# Patient Record
Sex: FEMALE | Race: ASIAN | NOT HISPANIC OR LATINO | URBAN - METROPOLITAN AREA
[De-identification: names, ages, dates, MRNs, and addresses within clinical notes are randomized per-mention and may not be internally consistent; named-entity substitution may affect disease eponyms.]

---

## 2023-04-25 ENCOUNTER — INPATIENT (INPATIENT)
Facility: HOSPITAL | Age: 33
LOS: 2 days | Discharge: HOME | End: 2023-04-28
Attending: OBSTETRICS & GYNECOLOGY | Admitting: OBSTETRICS & GYNECOLOGY
Payer: COMMERCIAL

## 2023-04-25 VITALS — HEIGHT: 60 IN | WEIGHT: 145.06 LBS

## 2023-04-25 DIAGNOSIS — Z3A.00 WEEKS OF GESTATION OF PREGNANCY NOT SPECIFIED: ICD-10-CM

## 2023-04-25 DIAGNOSIS — O26.899 OTHER SPECIFIED PREGNANCY RELATED CONDITIONS, UNSPECIFIED TRIMESTER: ICD-10-CM

## 2023-04-25 LAB
ALBUMIN SERPL ELPH-MCNC: 3.4 G/DL — SIGNIFICANT CHANGE UP (ref 3.3–5)
ALP SERPL-CCNC: 113 U/L — SIGNIFICANT CHANGE UP (ref 40–120)
ALT FLD-CCNC: 20 U/L — SIGNIFICANT CHANGE UP (ref 10–45)
AMNISURE ROM (RUPTURE OF MEMBRANES): POSITIVE
ANION GAP SERPL CALC-SCNC: 8 MMOL/L — SIGNIFICANT CHANGE UP (ref 5–17)
APTT BLD: 30.3 SEC — SIGNIFICANT CHANGE UP (ref 27.5–35.5)
AST SERPL-CCNC: 21 U/L — SIGNIFICANT CHANGE UP (ref 10–40)
BASOPHILS # BLD AUTO: 0.03 K/UL — SIGNIFICANT CHANGE UP (ref 0–0.2)
BASOPHILS NFR BLD AUTO: 0.3 % — SIGNIFICANT CHANGE UP (ref 0–2)
BILIRUB SERPL-MCNC: 0.2 MG/DL — SIGNIFICANT CHANGE UP (ref 0.2–1.2)
BLD GP AB SCN SERPL QL: NEGATIVE — SIGNIFICANT CHANGE UP
BUN SERPL-MCNC: 8 MG/DL — SIGNIFICANT CHANGE UP (ref 7–23)
CALCIUM SERPL-MCNC: 9.4 MG/DL — SIGNIFICANT CHANGE UP (ref 8.4–10.5)
CHLORIDE SERPL-SCNC: 104 MMOL/L — SIGNIFICANT CHANGE UP (ref 96–108)
CO2 SERPL-SCNC: 23 MMOL/L — SIGNIFICANT CHANGE UP (ref 22–31)
CREAT ?TM UR-MCNC: 142 MG/DL — SIGNIFICANT CHANGE UP
CREAT SERPL-MCNC: 0.49 MG/DL — LOW (ref 0.5–1.3)
EGFR: 128 ML/MIN/1.73M2 — SIGNIFICANT CHANGE UP
EOSINOPHIL # BLD AUTO: 0.07 K/UL — SIGNIFICANT CHANGE UP (ref 0–0.5)
EOSINOPHIL NFR BLD AUTO: 0.7 % — SIGNIFICANT CHANGE UP (ref 0–6)
FIBRINOGEN PPP-MCNC: 611 MG/DL — HIGH (ref 200–445)
GLUCOSE SERPL-MCNC: 109 MG/DL — HIGH (ref 70–99)
HCT VFR BLD CALC: 36.4 % — SIGNIFICANT CHANGE UP (ref 34.5–45)
HGB BLD-MCNC: 12.2 G/DL — SIGNIFICANT CHANGE UP (ref 11.5–15.5)
IMM GRANULOCYTES NFR BLD AUTO: 0.4 % — SIGNIFICANT CHANGE UP (ref 0–0.9)
INR BLD: 0.89 — SIGNIFICANT CHANGE UP (ref 0.88–1.16)
LYMPHOCYTES # BLD AUTO: 1.16 K/UL — SIGNIFICANT CHANGE UP (ref 1–3.3)
LYMPHOCYTES # BLD AUTO: 11.5 % — LOW (ref 13–44)
MAGNESIUM SERPL-MCNC: 2 MG/DL — SIGNIFICANT CHANGE UP (ref 1.6–2.6)
MCHC RBC-ENTMCNC: 32.6 PG — SIGNIFICANT CHANGE UP (ref 27–34)
MCHC RBC-ENTMCNC: 33.5 GM/DL — SIGNIFICANT CHANGE UP (ref 32–36)
MCV RBC AUTO: 97.3 FL — SIGNIFICANT CHANGE UP (ref 80–100)
MONOCYTES # BLD AUTO: 0.49 K/UL — SIGNIFICANT CHANGE UP (ref 0–0.9)
MONOCYTES NFR BLD AUTO: 4.8 % — SIGNIFICANT CHANGE UP (ref 2–14)
NEUTROPHILS # BLD AUTO: 8.32 K/UL — HIGH (ref 1.8–7.4)
NEUTROPHILS NFR BLD AUTO: 82.3 % — HIGH (ref 43–77)
NRBC # BLD: 0 /100 WBCS — SIGNIFICANT CHANGE UP (ref 0–0)
PHOSPHATE SERPL-MCNC: 3.5 MG/DL — SIGNIFICANT CHANGE UP (ref 2.5–4.5)
PLATELET # BLD AUTO: 295 K/UL — SIGNIFICANT CHANGE UP (ref 150–400)
POTASSIUM SERPL-MCNC: 4 MMOL/L — SIGNIFICANT CHANGE UP (ref 3.5–5.3)
POTASSIUM SERPL-SCNC: 4 MMOL/L — SIGNIFICANT CHANGE UP (ref 3.5–5.3)
PROT ?TM UR-MCNC: 16 MG/DL — HIGH (ref 0–12)
PROT SERPL-MCNC: 6.5 G/DL — SIGNIFICANT CHANGE UP (ref 6–8.3)
PROT/CREAT UR-RTO: 0.1 RATIO — SIGNIFICANT CHANGE UP (ref 0–0.2)
PROTHROM AB SERPL-ACNC: 10.6 SEC — SIGNIFICANT CHANGE UP (ref 10.5–13.4)
RBC # BLD: 3.74 M/UL — LOW (ref 3.8–5.2)
RBC # FLD: 13.2 % — SIGNIFICANT CHANGE UP (ref 10.3–14.5)
RH IG SCN BLD-IMP: POSITIVE — SIGNIFICANT CHANGE UP
RH IG SCN BLD-IMP: POSITIVE — SIGNIFICANT CHANGE UP
SODIUM SERPL-SCNC: 135 MMOL/L — SIGNIFICANT CHANGE UP (ref 135–145)
WBC # BLD: 10.11 K/UL — SIGNIFICANT CHANGE UP (ref 3.8–10.5)
WBC # FLD AUTO: 10.11 K/UL — SIGNIFICANT CHANGE UP (ref 3.8–10.5)

## 2023-04-25 PROCEDURE — 88307 TISSUE EXAM BY PATHOLOGIST: CPT | Mod: 26

## 2023-04-25 DEVICE — INTERCEED 3 X 4": Type: IMPLANTABLE DEVICE | Status: FUNCTIONAL

## 2023-04-25 RX ORDER — AZITHROMYCIN 500 MG/1
500 TABLET, FILM COATED ORAL ONCE
Refills: 0 | Status: DISCONTINUED | OUTPATIENT
Start: 2023-04-25 | End: 2023-04-25

## 2023-04-25 RX ORDER — OXYTOCIN 10 UNIT/ML
333.33 VIAL (ML) INJECTION
Qty: 20 | Refills: 0 | Status: DISCONTINUED | OUTPATIENT
Start: 2023-04-25 | End: 2023-04-28

## 2023-04-25 RX ORDER — OXYCODONE HYDROCHLORIDE 5 MG/1
5 TABLET ORAL
Refills: 0 | Status: DISCONTINUED | OUTPATIENT
Start: 2023-04-25 | End: 2023-04-28

## 2023-04-25 RX ORDER — SIMETHICONE 80 MG/1
80 TABLET, CHEWABLE ORAL EVERY 4 HOURS
Refills: 0 | Status: DISCONTINUED | OUTPATIENT
Start: 2023-04-25 | End: 2023-04-28

## 2023-04-25 RX ORDER — SODIUM CHLORIDE 9 MG/ML
1000 INJECTION, SOLUTION INTRAVENOUS ONCE
Refills: 0 | Status: DISCONTINUED | OUTPATIENT
Start: 2023-04-25 | End: 2023-04-25

## 2023-04-25 RX ORDER — OXYCODONE HYDROCHLORIDE 5 MG/1
5 TABLET ORAL ONCE
Refills: 0 | Status: DISCONTINUED | OUTPATIENT
Start: 2023-04-25 | End: 2023-04-28

## 2023-04-25 RX ORDER — SODIUM CHLORIDE 9 MG/ML
1000 INJECTION, SOLUTION INTRAVENOUS
Refills: 0 | Status: DISCONTINUED | OUTPATIENT
Start: 2023-04-25 | End: 2023-04-25

## 2023-04-25 RX ORDER — IBUPROFEN 200 MG
600 TABLET ORAL EVERY 6 HOURS
Refills: 0 | Status: COMPLETED | OUTPATIENT
Start: 2023-04-25 | End: 2024-03-23

## 2023-04-25 RX ORDER — ACETAMINOPHEN 500 MG
975 TABLET ORAL
Refills: 0 | Status: DISCONTINUED | OUTPATIENT
Start: 2023-04-25 | End: 2023-04-28

## 2023-04-25 RX ORDER — KETOROLAC TROMETHAMINE 30 MG/ML
30 SYRINGE (ML) INJECTION EVERY 6 HOURS
Refills: 0 | Status: DISCONTINUED | OUTPATIENT
Start: 2023-04-25 | End: 2023-04-26

## 2023-04-25 RX ORDER — NALOXONE HYDROCHLORIDE 4 MG/.1ML
0.1 SPRAY NASAL
Refills: 0 | Status: DISCONTINUED | OUTPATIENT
Start: 2023-04-25 | End: 2023-04-25

## 2023-04-25 RX ORDER — OXYTOCIN 10 UNIT/ML
333.33 VIAL (ML) INJECTION
Qty: 20 | Refills: 0 | Status: DISCONTINUED | OUTPATIENT
Start: 2023-04-25 | End: 2023-04-25

## 2023-04-25 RX ORDER — LANOLIN
1 OINTMENT (GRAM) TOPICAL EVERY 6 HOURS
Refills: 0 | Status: DISCONTINUED | OUTPATIENT
Start: 2023-04-25 | End: 2023-04-28

## 2023-04-25 RX ORDER — DEXAMETHASONE 0.5 MG/5ML
4 ELIXIR ORAL EVERY 6 HOURS
Refills: 0 | Status: DISCONTINUED | OUTPATIENT
Start: 2023-04-25 | End: 2023-04-25

## 2023-04-25 RX ORDER — SODIUM CHLORIDE 9 MG/ML
1000 INJECTION, SOLUTION INTRAVENOUS
Refills: 0 | Status: DISCONTINUED | OUTPATIENT
Start: 2023-04-25 | End: 2023-04-28

## 2023-04-25 RX ORDER — CITRIC ACID/SODIUM CITRATE 300-500 MG
30 SOLUTION, ORAL ORAL ONCE
Refills: 0 | Status: COMPLETED | OUTPATIENT
Start: 2023-04-25 | End: 2023-04-25

## 2023-04-25 RX ORDER — CEFAZOLIN SODIUM 1 G
2000 VIAL (EA) INJECTION ONCE
Refills: 0 | Status: COMPLETED | OUTPATIENT
Start: 2023-04-25 | End: 2023-04-25

## 2023-04-25 RX ORDER — MAGNESIUM HYDROXIDE 400 MG/1
30 TABLET, CHEWABLE ORAL
Refills: 0 | Status: DISCONTINUED | OUTPATIENT
Start: 2023-04-25 | End: 2023-04-28

## 2023-04-25 RX ORDER — TETANUS TOXOID, REDUCED DIPHTHERIA TOXOID AND ACELLULAR PERTUSSIS VACCINE, ADSORBED 5; 2.5; 8; 8; 2.5 [IU]/.5ML; [IU]/.5ML; UG/.5ML; UG/.5ML; UG/.5ML
0.5 SUSPENSION INTRAMUSCULAR ONCE
Refills: 0 | Status: DISCONTINUED | OUTPATIENT
Start: 2023-04-25 | End: 2023-04-28

## 2023-04-25 RX ORDER — DIPHENHYDRAMINE HCL 50 MG
25 CAPSULE ORAL EVERY 6 HOURS
Refills: 0 | Status: DISCONTINUED | OUTPATIENT
Start: 2023-04-25 | End: 2023-04-28

## 2023-04-25 RX ORDER — FAMOTIDINE 10 MG/ML
20 INJECTION INTRAVENOUS ONCE
Refills: 0 | Status: COMPLETED | OUTPATIENT
Start: 2023-04-25 | End: 2023-04-25

## 2023-04-25 RX ORDER — ONDANSETRON 8 MG/1
4 TABLET, FILM COATED ORAL EVERY 6 HOURS
Refills: 0 | Status: DISCONTINUED | OUTPATIENT
Start: 2023-04-25 | End: 2023-04-25

## 2023-04-25 RX ADMIN — Medication 30 MILLILITER(S): at 15:26

## 2023-04-25 RX ADMIN — Medication 100 MILLIGRAM(S): at 15:26

## 2023-04-25 RX ADMIN — SODIUM CHLORIDE 125 MILLILITER(S): 9 INJECTION, SOLUTION INTRAVENOUS at 13:44

## 2023-04-25 RX ADMIN — Medication 12 MILLIGRAM(S): at 13:25

## 2023-04-25 RX ADMIN — Medication 30 MILLIGRAM(S): at 19:34

## 2023-04-25 RX ADMIN — FAMOTIDINE 20 MILLIGRAM(S): 10 INJECTION INTRAVENOUS at 15:26

## 2023-04-25 NOTE — LACTATION INITIAL EVALUATION - NS LACT CON REASON FOR REQ
routine NICU Consult/inverted/flat nipples/primaparous mom/premature infant/patient request/NICU admission

## 2023-04-25 NOTE — PATIENT PROFILE OB - FALL HARM RISK - UNIVERSAL INTERVENTIONS
Bed in lowest position, wheels locked, appropriate side rails in place/Call bell, personal items and telephone in reach/Instruct patient to call for assistance before getting out of bed or chair/Non-slip footwear when patient is out of bed/[place] to call system/Physically safe environment - no spills, clutter or unnecessary equipment/Purposeful Proactive Rounding/Room/bathroom lighting operational, light cord in reach

## 2023-04-25 NOTE — PRE-ANESTHESIA EVALUATION ADULT - NSANTHOSAYNRD_GEN_A_CORE
No. FREDERIC screening performed.  STOP BANG Legend: 0-2 = LOW Risk; 3-4 = INTERMEDIATE Risk; 5-8 = HIGH Risk

## 2023-04-25 NOTE — PRE-ANESTHESIA EVALUATION ADULT - NSANTHAPLANRD_GEN_ALL_CORE
Per Dr Walden: CT cervical spine ordered.  Pt advised of same.    Dr Walden,  I did pend an order for CT with contrast.  Please sign, if appropriate  Thank you!    Nursing staff -- no need to call pt back.   regional

## 2023-04-26 LAB
BASOPHILS # BLD AUTO: 0.01 K/UL — SIGNIFICANT CHANGE UP (ref 0–0.2)
BASOPHILS NFR BLD AUTO: 0.1 % — SIGNIFICANT CHANGE UP (ref 0–2)
COVID-19 SPIKE DOMAIN AB INTERP: POSITIVE
COVID-19 SPIKE DOMAIN ANTIBODY RESULT: >250 U/ML — HIGH
EOSINOPHIL # BLD AUTO: 0 K/UL — SIGNIFICANT CHANGE UP (ref 0–0.5)
EOSINOPHIL NFR BLD AUTO: 0 % — SIGNIFICANT CHANGE UP (ref 0–6)
HCT VFR BLD CALC: 31.1 % — LOW (ref 34.5–45)
HGB BLD-MCNC: 10.7 G/DL — LOW (ref 11.5–15.5)
IMM GRANULOCYTES NFR BLD AUTO: 0.5 % — SIGNIFICANT CHANGE UP (ref 0–0.9)
LYMPHOCYTES # BLD AUTO: 1.38 K/UL — SIGNIFICANT CHANGE UP (ref 1–3.3)
LYMPHOCYTES # BLD AUTO: 8.7 % — LOW (ref 13–44)
MCHC RBC-ENTMCNC: 33.4 PG — SIGNIFICANT CHANGE UP (ref 27–34)
MCHC RBC-ENTMCNC: 34.4 GM/DL — SIGNIFICANT CHANGE UP (ref 32–36)
MCV RBC AUTO: 97.2 FL — SIGNIFICANT CHANGE UP (ref 80–100)
MONOCYTES # BLD AUTO: 0.61 K/UL — SIGNIFICANT CHANGE UP (ref 0–0.9)
MONOCYTES NFR BLD AUTO: 3.9 % — SIGNIFICANT CHANGE UP (ref 2–14)
NEUTROPHILS # BLD AUTO: 13.7 K/UL — HIGH (ref 1.8–7.4)
NEUTROPHILS NFR BLD AUTO: 86.8 % — HIGH (ref 43–77)
NRBC # BLD: 0 /100 WBCS — SIGNIFICANT CHANGE UP (ref 0–0)
PLATELET # BLD AUTO: 262 K/UL — SIGNIFICANT CHANGE UP (ref 150–400)
RBC # BLD: 3.2 M/UL — LOW (ref 3.8–5.2)
RBC # FLD: 13.2 % — SIGNIFICANT CHANGE UP (ref 10.3–14.5)
SARS-COV-2 IGG+IGM SERPL QL IA: >250 U/ML — HIGH
SARS-COV-2 IGG+IGM SERPL QL IA: POSITIVE
T PALLIDUM AB TITR SER: NEGATIVE — SIGNIFICANT CHANGE UP
WBC # BLD: 15.78 K/UL — HIGH (ref 3.8–10.5)
WBC # FLD AUTO: 15.78 K/UL — HIGH (ref 3.8–10.5)

## 2023-04-26 RX ORDER — ENOXAPARIN SODIUM 100 MG/ML
40 INJECTION SUBCUTANEOUS EVERY 24 HOURS
Refills: 0 | Status: DISCONTINUED | OUTPATIENT
Start: 2023-04-26 | End: 2023-04-28

## 2023-04-26 RX ORDER — IBUPROFEN 200 MG
600 TABLET ORAL EVERY 6 HOURS
Refills: 0 | Status: DISCONTINUED | OUTPATIENT
Start: 2023-04-26 | End: 2023-04-28

## 2023-04-26 RX ADMIN — Medication 975 MILLIGRAM(S): at 09:21

## 2023-04-26 RX ADMIN — Medication 975 MILLIGRAM(S): at 14:51

## 2023-04-26 RX ADMIN — Medication 30 MILLIGRAM(S): at 01:01

## 2023-04-26 RX ADMIN — Medication 975 MILLIGRAM(S): at 03:02

## 2023-04-26 RX ADMIN — Medication 30 MILLIGRAM(S): at 06:37

## 2023-04-26 RX ADMIN — Medication 600 MILLIGRAM(S): at 23:34

## 2023-04-26 RX ADMIN — Medication 30 MILLIGRAM(S): at 12:18

## 2023-04-26 RX ADMIN — Medication 600 MILLIGRAM(S): at 18:30

## 2023-04-26 RX ADMIN — SIMETHICONE 80 MILLIGRAM(S): 80 TABLET, CHEWABLE ORAL at 20:54

## 2023-04-26 RX ADMIN — ENOXAPARIN SODIUM 40 MILLIGRAM(S): 100 INJECTION SUBCUTANEOUS at 10:15

## 2023-04-26 RX ADMIN — Medication 975 MILLIGRAM(S): at 09:39

## 2023-04-26 RX ADMIN — Medication 30 MILLIGRAM(S): at 07:37

## 2023-04-26 RX ADMIN — Medication 975 MILLIGRAM(S): at 15:45

## 2023-04-26 RX ADMIN — Medication 600 MILLIGRAM(S): at 17:52

## 2023-04-26 RX ADMIN — Medication 30 MILLIGRAM(S): at 00:23

## 2023-04-26 RX ADMIN — Medication 975 MILLIGRAM(S): at 03:35

## 2023-04-26 NOTE — PROGRESS NOTE ADULT - ASSESSMENT
A/P: 32y POD1 s/p  section. Pt is hemodynamically stable.   -  Neuro-Pain control with motrin/acetaminophen, oxycodone for severe pain PRN  -  Cardio: no issues. Continue to monitor routinely   -  GI: Reg diet, Reglan/Zofran prn   -  : will d/c ulrich this AM  -  DVT prophylaxis: Lovenox 40mg qd, SCDs  -  Activity- ambulate as tolerated   -  Heme: post-op hemoglobin pending A/P: 32y POD1 s/p  section. Pt is hemodynamically stable.   -  Neuro-Pain control with motrin/acetaminophen, oxycodone for severe pain PRN  -  Cardio: no issues. Continue to monitor routinely   -  GI: Reg diet, Reglan/Zofran prn   -  : will d/c ulrich this AM  -  DVT prophylaxis: Lovenox 40mg qd, SCDs  -  Activity- ambulate as tolerated   -  Heme: post-op hemoglobin pending    attending note:  patient seen and examined  vssaf  doing well  meeting milestones  discharge planning  -dr adele carlin

## 2023-04-27 RX ORDER — ACETAMINOPHEN 500 MG
3 TABLET ORAL
Qty: 0 | Refills: 0 | DISCHARGE
Start: 2023-04-27

## 2023-04-27 RX ORDER — IBUPROFEN 200 MG
1 TABLET ORAL
Qty: 0 | Refills: 0 | DISCHARGE
Start: 2023-04-27

## 2023-04-27 RX ADMIN — Medication 600 MILLIGRAM(S): at 18:54

## 2023-04-27 RX ADMIN — Medication 600 MILLIGRAM(S): at 06:00

## 2023-04-27 RX ADMIN — Medication 975 MILLIGRAM(S): at 15:13

## 2023-04-27 RX ADMIN — Medication 600 MILLIGRAM(S): at 12:58

## 2023-04-27 RX ADMIN — Medication 975 MILLIGRAM(S): at 16:09

## 2023-04-27 RX ADMIN — Medication 975 MILLIGRAM(S): at 09:46

## 2023-04-27 RX ADMIN — ENOXAPARIN SODIUM 40 MILLIGRAM(S): 100 INJECTION SUBCUTANEOUS at 09:46

## 2023-04-27 RX ADMIN — Medication 600 MILLIGRAM(S): at 00:19

## 2023-04-27 RX ADMIN — SIMETHICONE 80 MILLIGRAM(S): 80 TABLET, CHEWABLE ORAL at 15:14

## 2023-04-27 RX ADMIN — Medication 600 MILLIGRAM(S): at 05:07

## 2023-04-27 RX ADMIN — Medication 600 MILLIGRAM(S): at 23:53

## 2023-04-27 RX ADMIN — SIMETHICONE 80 MILLIGRAM(S): 80 TABLET, CHEWABLE ORAL at 09:56

## 2023-04-27 RX ADMIN — Medication 600 MILLIGRAM(S): at 11:58

## 2023-04-27 RX ADMIN — Medication 975 MILLIGRAM(S): at 10:37

## 2023-04-27 RX ADMIN — Medication 975 MILLIGRAM(S): at 21:09

## 2023-04-27 RX ADMIN — Medication 600 MILLIGRAM(S): at 18:10

## 2023-04-27 RX ADMIN — Medication 975 MILLIGRAM(S): at 20:39

## 2023-04-27 NOTE — DISCHARGE NOTE OB - NS AS DC FU INST LIST INST
Att:  Pt examined. Cervix fully dilated/+2 station. FHR category I. Will start pushing. Dena Livingston MD no

## 2023-04-27 NOTE — DISCHARGE NOTE OB - CARE PLAN
1 Principal Discharge DX:	 delivery delivered  Assessment and plan of treatment:	 delivery, meeting all postoperative milestones.  Please follow-up with your OB doctor within 1-2 weeks.  You can resume a regular diet at home and may continue your prenatal vitamins as directed.  Please place nothing in the vagina for 6 weeks (no tampons, sex, douching, tub baths, swimming pools, etc).  If you have severe headaches and/or vision changes, heavy bleeding, or chest pain, please call your provider or go to the nearest Emergency Department.  Please call your OB with any signs of symptoms of infection including fever > 100.4 degrees, severe pain, malodorous vaginal discharge or heavy bleeding requiring more than 1-2 pads/hour.  You can take Motrin 600mg orally every 6 hours for pain as needed.  Secondary Diagnosis:	 delivery  Secondary Diagnosis:	Acute postoperative anemia due to expected blood loss  Secondary Diagnosis:	 premature rupture of membranes (PPROM) delivered, current hospitalization

## 2023-04-27 NOTE — DISCHARGE NOTE OB - SECONDARY DIAGNOSIS.
delivery  premature rupture of membranes (PPROM) delivered, current hospitalization Acute postoperative anemia due to expected blood loss

## 2023-04-27 NOTE — DISCHARGE NOTE OB - HOSPITAL COURSE
Admitted at 34w5d with PPROM, delivered via primary  section due to NRFHT.  Uncomplicated surgery and postoperative course.  Acute blood loss anemia noted on post-operative CBC.  Patient stable with normal vital signs.  No intervention necessary.

## 2023-04-27 NOTE — DISCHARGE NOTE OB - PATIENT PORTAL LINK FT
You can access the FollowMyHealth Patient Portal offered by [facility] by registering at the following website: [email or web address]. By joining [facility]’s FollowMyHealth portal, you will also be able to view your health information using other applications (apps) compatible with our system.

## 2023-04-27 NOTE — PROGRESS NOTE ADULT - ASSESSMENT
A/P: 32y s/p  section, POD#2, stable  -  Pain: PO motrin q6hrs, tylenol q8hrs, oxycodone for severe pain PRN  -  Post-operatively labs: post-op Hgb , hemodynamically stable, no symptoms of anemia   -  GI: tolerating regular diet, passing gas, simethicone PRN  -  : s/p ulrich , urinating without difficulty  -  DVT prophylaxis: encouraged increased ambulation, SCDs, SQL  -  Dispo: POD 3 or 4

## 2023-04-28 VITALS
TEMPERATURE: 98 F | HEART RATE: 65 BPM | RESPIRATION RATE: 17 BRPM | SYSTOLIC BLOOD PRESSURE: 116 MMHG | DIASTOLIC BLOOD PRESSURE: 71 MMHG | OXYGEN SATURATION: 97 %

## 2023-04-28 PROCEDURE — 84100 ASSAY OF PHOSPHORUS: CPT

## 2023-04-28 PROCEDURE — 86769 SARS-COV-2 COVID-19 ANTIBODY: CPT

## 2023-04-28 PROCEDURE — 85730 THROMBOPLASTIN TIME PARTIAL: CPT

## 2023-04-28 PROCEDURE — 86900 BLOOD TYPING SEROLOGIC ABO: CPT

## 2023-04-28 PROCEDURE — 99214 OFFICE O/P EST MOD 30 MIN: CPT

## 2023-04-28 PROCEDURE — 80053 COMPREHEN METABOLIC PANEL: CPT

## 2023-04-28 PROCEDURE — 85384 FIBRINOGEN ACTIVITY: CPT

## 2023-04-28 PROCEDURE — 86780 TREPONEMA PALLIDUM: CPT

## 2023-04-28 PROCEDURE — 82570 ASSAY OF URINE CREATININE: CPT

## 2023-04-28 PROCEDURE — 85610 PROTHROMBIN TIME: CPT

## 2023-04-28 PROCEDURE — 86850 RBC ANTIBODY SCREEN: CPT

## 2023-04-28 PROCEDURE — 84156 ASSAY OF PROTEIN URINE: CPT

## 2023-04-28 PROCEDURE — 83735 ASSAY OF MAGNESIUM: CPT

## 2023-04-28 PROCEDURE — 36415 COLL VENOUS BLD VENIPUNCTURE: CPT

## 2023-04-28 PROCEDURE — 85025 COMPLETE CBC W/AUTO DIFF WBC: CPT

## 2023-04-28 PROCEDURE — 59050 FETAL MONITOR W/REPORT: CPT

## 2023-04-28 PROCEDURE — 88307 TISSUE EXAM BY PATHOLOGIST: CPT

## 2023-04-28 PROCEDURE — 86901 BLOOD TYPING SEROLOGIC RH(D): CPT

## 2023-04-28 PROCEDURE — C1765: CPT

## 2023-04-28 PROCEDURE — 84112 EVAL AMNIOTIC FLUID PROTEIN: CPT

## 2023-04-28 RX ADMIN — Medication 600 MILLIGRAM(S): at 00:23

## 2023-04-28 RX ADMIN — Medication 600 MILLIGRAM(S): at 11:52

## 2023-04-28 RX ADMIN — Medication 600 MILLIGRAM(S): at 06:04

## 2023-04-28 RX ADMIN — Medication 975 MILLIGRAM(S): at 16:23

## 2023-04-28 RX ADMIN — Medication 975 MILLIGRAM(S): at 03:25

## 2023-04-28 RX ADMIN — Medication 975 MILLIGRAM(S): at 02:55

## 2023-04-28 RX ADMIN — Medication 600 MILLIGRAM(S): at 18:01

## 2023-04-28 RX ADMIN — Medication 600 MILLIGRAM(S): at 12:46

## 2023-04-28 RX ADMIN — Medication 975 MILLIGRAM(S): at 10:04

## 2023-04-28 RX ADMIN — Medication 600 MILLIGRAM(S): at 19:02

## 2023-04-28 RX ADMIN — ENOXAPARIN SODIUM 40 MILLIGRAM(S): 100 INJECTION SUBCUTANEOUS at 09:04

## 2023-04-28 RX ADMIN — MAGNESIUM HYDROXIDE 30 MILLILITER(S): 400 TABLET, CHEWABLE ORAL at 06:20

## 2023-04-28 RX ADMIN — Medication 600 MILLIGRAM(S): at 06:34

## 2023-04-28 RX ADMIN — Medication 975 MILLIGRAM(S): at 15:29

## 2023-04-28 RX ADMIN — Medication 975 MILLIGRAM(S): at 09:04

## 2023-04-28 NOTE — PROGRESS NOTE ADULT - SUBJECTIVE AND OBJECTIVE BOX
Patient evaluated at bedside this morning, resting comfortable in bed.   She reports pain is well controlled with OPM  She denies headache, dizziness, chest pain, palpitations, shortness of breathe, nausea, vomiting or heavy vaginal bleeding.  She has been ambulating without assistance, voiding spontaneously, passing gas, tolerating regular diet.    Physical Exam:  Vital Signs Last 24 Hrs  T(C): 37.1 (27 Apr 2023 06:00), Max: 37.1 (27 Apr 2023 06:00)  T(F): 98.7 (27 Apr 2023 06:00), Max: 98.7 (27 Apr 2023 06:00)  HR: 63 (27 Apr 2023 06:00) (63 - 80)  BP: 102/65 (27 Apr 2023 06:00) (91/60 - 110/64)  BP(mean): --  RR: 18 (27 Apr 2023 06:00) (18 - 19)  SpO2: 96% (27 Apr 2023 06:00) (96% - 97%)    Parameters below as of 27 Apr 2023 06:00  Patient On (Oxygen Delivery Method): room air        GA: NAD, A+0 x 3  Pulm: comfotable on RA  Abd: + BS, soft, nontender, nondistended, no rebound or guarding, incision clean, dry and intact, uterus firm at midline,  2 fb below umbilicus  : lochia WNL  Extremities: no swelling or calf tenderness                             10.7   15.78 )-----------( 262      ( 26 Apr 2023 05:30 )             31.1     04-25    135  |  104  |  8   ----------------------------<  109<H>  4.0   |  23  |  0.49<L>    Ca    9.4      25 Apr 2023 12:03  Phos  3.5     04-25  Mg     2.0     04-25    TPro  6.5  /  Alb  3.4  /  TBili  0.2  /  DBili  x   /  AST  21  /  ALT  20  /  AlkPhos  113  04-25      PT/INR - ( 25 Apr 2023 14:32 )   PT: 10.6 sec;   INR: 0.89          PTT - ( 25 Apr 2023 14:32 )  PTT:30.3 sec  
Patient evaluated at bedside. No acute events overnight. She reports pain is well controlled with OPM. Adequate urine output.  She denies headache, dizziness, chest pain, palpitations, shortness of breath, nausea, vomiting or heavy vaginal bleeding.      Physical Exam:  Vital Signs Last 24 Hrs  T(C): 36.8 (27 Apr 2023 14:00), Max: 37.1 (27 Apr 2023 06:00)  T(F): 98.3 (27 Apr 2023 14:00), Max: 98.7 (27 Apr 2023 06:00)  HR: 69 (27 Apr 2023 14:00) (63 - 80)  BP: 103/65 (27 Apr 2023 14:00) (91/60 - 103/65)  BP(mean): --  RR: 18 (27 Apr 2023 14:00) (18 - 19)  SpO2: 97% (27 Apr 2023 14:00) (96% - 97%)    Parameters below as of 27 Apr 2023 10:00  Patient On (Oxygen Delivery Method): room air        GA: NAD, A+0 x 3  Abd: + BS, soft, nontender, nondistended, no rebound or guarding, uterus firm at midline, 2 fb below umbilicus  Incision clean, dry and intact  : lochia WNL  Extremities: no calf tenderness                            10.7   15.78 )-----------( 262      ( 26 Apr 2023 05:30 )             31.1             PT/INR - ( 25 Apr 2023 14:32 )   PT: 10.6 sec;   INR: 0.89          PTT - ( 25 Apr 2023 14:32 )  PTT:30.3 sec    A/P  yo 32y s/p c/s, POD #2  ,stable    Diet: regular  Pain: OPM  OOB/SCDs/IS  Continue to monitor  Anticipate discharge POD #3 or #4  
Patient seen and evaluated at bedside. Pt states she has mild abdominal pain, overall controlled with pain medication. She is tolerating reg diet, passing flatus, having bowel movements, and voiding. Pt is breastfeeding.  She denies headache, dizziness, chest pain, palpitations, shortness of breath, nausea, vomiting, or heavy vaginal bleeding.    Physical Exam:  Vital Signs Last 24 Hrs  T(C): 36.8 (28 Apr 2023 05:26), Max: 37.1 (27 Apr 2023 22:04)  T(F): 98.3 (28 Apr 2023 05:26), Max: 98.8 (27 Apr 2023 22:04)  HR: 63 (28 Apr 2023 05:26) (63 - 69)  BP: 112/68 (28 Apr 2023 05:26) (92/60 - 112/68)  RR: 18 (28 Apr 2023 05:26) (18 - 18)  SpO2: 98% (28 Apr 2023 05:26) (96% - 98%)    Parameters below as of 27 Apr 2023 18:00  Patient On (Oxygen Delivery Method): room air        GA: comfortable in NAD  Abd: soft, nontender, nondistended, no rebound or guarding, incision clean, dry and intact, uterus firm at midline  : lochia WNL  Extremities: no calf tenderness, SCDs in place                  acetaminophen     Tablet .. 975 milliGRAM(s) Oral <User Schedule>  diphenhydrAMINE 25 milliGRAM(s) Oral every 6 hours PRN  diphtheria/tetanus/pertussis (acellular) Vaccine (Adacel) 0.5 milliLiter(s) IntraMuscular once  enoxaparin Injectable 40 milliGRAM(s) SubCutaneous every 24 hours  ibuprofen  Tablet. 600 milliGRAM(s) Oral every 6 hours  lactated ringers. 1000 milliLiter(s) IV Continuous <Continuous>  lanolin Ointment 1 Application(s) Topical every 6 hours PRN  magnesium hydroxide Suspension 30 milliLiter(s) Oral two times a day PRN  oxyCODONE    IR 5 milliGRAM(s) Oral once PRN  oxyCODONE    IR 5 milliGRAM(s) Oral every 3 hours PRN  oxytocin Infusion 333.333 milliUNIT(s)/Min IV Continuous <Continuous>  simethicone 80 milliGRAM(s) Chew every 4 hours PRN  
Patient seen and evaluated at bedside. Pt states she has mild abdominal pain, overall controlled with pain medication. She is tolerating reg diet, passing flatus. Not yet OOB or voiding, ulrich in place  She denies headache, dizziness, chest pain, palpitations, shortness of breath, nausea, vomiting, or heavy vaginal bleeding.    Physical Exam:  Vital Signs Last 24 Hrs  T(C): 37.1 (26 Apr 2023 02:04), Max: 37.2 (25 Apr 2023 21:51)  T(F): 98.7 (26 Apr 2023 02:04), Max: 98.9 (25 Apr 2023 21:51)  HR: 69 (26 Apr 2023 02:04) (64 - 76)  BP: 112/63 (26 Apr 2023 02:04) (95/54 - 120/69)  RR: 19 (26 Apr 2023 02:04) (17 - 19)  SpO2: 96% (26 Apr 2023 02:04) (95% - 99%)    Parameters below as of 26 Apr 2023 02:04  Patient On (Oxygen Delivery Method): room air        GA: comfortable in NAD  Abd: soft, nontender, nondistended, no rebound or guarding, incision clean, dry and intact w/ steri strips, uterus firm at midline, ?? fb below umbilicus  : ulrich in situ draining clear urine, lochia WNL  Extremities: no calf tenderness, SCDs in place                            12.2   10.11 )-----------( 295      ( 25 Apr 2023 12:03 )             36.4     04-25    135  |  104  |  8   ----------------------------<  109<H>  4.0   |  23  |  0.49<L>    Ca    9.4      25 Apr 2023 12:03  Phos  3.5     04-25  Mg     2.0     04-25    TPro  6.5  /  Alb  3.4  /  TBili  0.2  /  DBili  x   /  AST  21  /  ALT  20  /  AlkPhos  113  04-25      PT/INR - ( 25 Apr 2023 14:32 )   PT: 10.6 sec;   INR: 0.89          PTT - ( 25 Apr 2023 14:32 )  PTT:30.3 sec  acetaminophen     Tablet .. 975 milliGRAM(s) Oral <User Schedule>  diphenhydrAMINE 25 milliGRAM(s) Oral every 6 hours PRN  diphtheria/tetanus/pertussis (acellular) Vaccine (Adacel) 0.5 milliLiter(s) IntraMuscular once  ibuprofen  Tablet. 600 milliGRAM(s) Oral every 6 hours  ketorolac   Injectable 30 milliGRAM(s) IV Push every 6 hours  lactated ringers. 1000 milliLiter(s) IV Continuous <Continuous>  lanolin Ointment 1 Application(s) Topical every 6 hours PRN  magnesium hydroxide Suspension 30 milliLiter(s) Oral two times a day PRN  oxyCODONE    IR 5 milliGRAM(s) Oral once PRN  oxyCODONE    IR 5 milliGRAM(s) Oral every 3 hours PRN  oxytocin Infusion 333.333 milliUNIT(s)/Min IV Continuous <Continuous>  simethicone 80 milliGRAM(s) Chew every 4 hours PRN

## 2023-04-28 NOTE — PROGRESS NOTE ADULT - ASSESSMENT
A/P: 32y POD3 s/p  section. Pt is hemodynamically stable.   -  Neuro-Pain control with motrin/acetaminophen, oxycodone for severe pain PRN  -  Cardio: no issues. Continue to monitor routinely   -  GI: Reg diet, Reglan/Zofran prn   -  : voiding  -  DVT prophylaxis: Lovenox 40mg qd, SCDs  -  Activity- ambulate as tolerated     A/P: 32y POD3 s/p  section. Pt is hemodynamically stable.   -  Neuro-Pain control with motrin/acetaminophen, oxycodone for severe pain PRN  -  Cardio: no issues. Continue to monitor routinely   -  GI: Reg diet, Reglan/Zofran prn   -  : voiding  -  DVT prophylaxis: Lovenox 40mg qd, SCDs  -  Activity- ambulate as tolerated      attending note:  patient seen and examined  vssaf  incision c/d/i  neg calf tenderness  s/p c/s  meeting milestones  discharge planning  -dr adele carlin

## 2023-05-01 DIAGNOSIS — Z3A.34 34 WEEKS GESTATION OF PREGNANCY: ICD-10-CM

## 2023-05-01 DIAGNOSIS — Z34.03 ENCOUNTER FOR SUPERVISION OF NORMAL FIRST PREGNANCY, THIRD TRIMESTER: ICD-10-CM

## 2023-05-01 DIAGNOSIS — Z28.21 IMMUNIZATION NOT CARRIED OUT BECAUSE OF PATIENT REFUSAL: ICD-10-CM

## 2023-05-01 DIAGNOSIS — Z28.09 IMMUNIZATION NOT CARRIED OUT BECAUSE OF OTHER CONTRAINDICATION: ICD-10-CM

## 2023-05-11 LAB — SURGICAL PATHOLOGY STUDY: SIGNIFICANT CHANGE UP

## 2024-05-02 NOTE — PATIENT PROFILE OB - AS SC BRADEN ACTIVITY

## 2024-11-09 NOTE — DISCHARGE NOTE OB - WAS YOUR LAST COVID-19 VACCINE GREATER THAN OR EQUAL TO TWO MONTHS AGO?
Problem: Bronchoconstriction  Goal: Improve in air movement and diminished wheezing  Description: Target End Date:  2 to 3 days    1.  Implement inhaled treatments  2.  Evaluate and manage medication effects  Outcome: Not Met   SVN  
The patient is Stable - Low risk of patient condition declining or worsening    Shift Goals  Clinical Goals: Pt will remain above 90% oxygen level, pt will remain free from skin injuries this shift  Patient Goals: eat, very hungry  Family Goals: MIKE    Progress made toward(s) clinical / shift goals:   Pt's breathing remained stable with RRs between 16-20, SPO2 levels were monitored this shift and maintained above 90% with the use of O2 at 4 L. Pt placed in semi-fowlers or higher to increase oxygenation this shift. Implemented appropriate wound care interventions to prevent complications this shift. Pt remains free from new purulent drainage to wounds. Pt verbalizes understanding of wound care management. Skin breakdown prevention measures in place:  daily skin care, barrier paste.    Problem: Respiratory  Goal: Patient will achieve/maintain optimum respiratory ventilation and gas exchange  Outcome: Progressing     Problem: Skin Integrity  Goal: Skin integrity is maintained or improved  Outcome: Progressing     Patient is not progressing towards the following goals:      
Yes

## 2025-02-26 ENCOUNTER — EMERGENCY (EMERGENCY)
Age: 35
LOS: 1 days | Discharge: HOME | End: 2025-02-26
Admitting: EMERGENCY MEDICINE
Payer: COMMERCIAL

## 2025-02-26 VITALS
SYSTOLIC BLOOD PRESSURE: 106 MMHG | OXYGEN SATURATION: 98 % | DIASTOLIC BLOOD PRESSURE: 71 MMHG | RESPIRATION RATE: 18 BRPM | TEMPERATURE: 98 F | HEIGHT: 64 IN | WEIGHT: 134.92 LBS | HEART RATE: 69 BPM

## 2025-02-26 PROCEDURE — 99283 EMERGENCY DEPT VISIT LOW MDM: CPT

## 2025-02-26 PROCEDURE — 73660 X-RAY EXAM OF TOE(S): CPT | Mod: 26,RT
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
## 2025-02-28 DIAGNOSIS — M79.674 PAIN IN RIGHT TOE(S): ICD-10-CM

## 2025-02-28 DIAGNOSIS — S99.921A UNSPECIFIED INJURY OF RIGHT FOOT, INITIAL ENCOUNTER: ICD-10-CM

## 2025-07-22 NOTE — PATIENT PROFILE OB - WEIGHT IN LBS
----- Message from Julieth Menon 145